# Patient Record
(demographics unavailable — no encounter records)

---

## 2025-07-01 NOTE — PHYSICAL EXAM
[Right] : right foot and ankle [Mild] : mild swelling of lateral foot [5th] : 5th [5___] : plantar flexion 5[unfilled]/5 [2+] : dorsalis pedis pulse: 2+ [] : no achilles tendon tenderness [TWNoteComboBox7] : dorsiflexion 10 degrees [de-identified] : plantar flexion 30 degrees

## 2025-07-01 NOTE — ASSESSMENT
[FreeTextEntry1] : protected wb in cam boot ice/elevate nsaids prn limit activity advised against driving f/up 3 wks w/ foot xray

## 2025-07-01 NOTE — HISTORY OF PRESENT ILLNESS
[Sharp] : sharp [de-identified] : 07/01/2025: inversion injury today w/ ankle pain. no tx to date. no prior ankle probs. denies dm/tob. works from home [] : Post Surgical Visit: no [FreeTextEntry1] : R ankle

## 2025-07-22 NOTE — IMAGING
[Right] : right foot [The fracture is in acceptable alignment. There is progression in healing seen] : The fracture is in acceptable alignment. There is progression in healing seen [de-identified] : spiral 5th mt fx

## 2025-07-22 NOTE — PHYSICAL EXAM
[Right] : right foot and ankle [Mild] : mild swelling of lateral foot [5th] : 5th [5___] : plantar flexion 5[unfilled]/5 [2+] : dorsalis pedis pulse: 2+ [] : no achilles tendon tenderness [NL (40)] : plantar flexion 40 degrees [FreeTextEntry8] : improved [TWNoteComboBox7] : dorsiflexion 15 degrees

## 2025-07-22 NOTE — HISTORY OF PRESENT ILLNESS
[Sharp] : sharp [de-identified] : 07/01/2025: inversion injury today w/ ankle pain. no tx to date. no prior ankle probs. denies dm/tob. works from home  07/22/2025:  improved. walking in boot [] : Post Surgical Visit: no [FreeTextEntry1] : R ankle